# Patient Record
(demographics unavailable — no encounter records)

---

## 2025-06-03 NOTE — END OF VISIT
[FreeTextEntry3] : I, Jerardo Cosme MD, I personally performed the services described in the documentation, reviewed the documentation recorded by the scribe in my presence and it accurately and completely records my words and actions

## 2025-06-03 NOTE — REASON FOR VISIT
[Consultation] : a consultation visit [Family Member] : family member [Parents] : parents [Other: _____] : [unfilled] [FreeTextEntry1] : Hip evaluation

## 2025-06-03 NOTE — PHYSICAL EXAM
[FreeTextEntry1] : General: Patient is awake and alert and in no acute distress, oriented to person. Well developed, well nourished, cooperative.   Skin: The skin is intact, warm, pink, and dry over the area examined.    Eyes: normal conjunctiva, normal eyelids and pupils were equal and round.   ENT: normal ears, normal nose and normal lips.  Cardiovascular: There is brisk capillary refill in the digits of the affected extremity. They are symmetric pulses in the bilateral upper and lower extremities, positive peripheral pulses, brisk capillary refill, but no peripheral edema.  Respiratory: The patient is in no apparent respiratory distress. They're taking full deep breaths without use of accessory muscles or evidence of audible wheezes or stridor without the use of a stethoscope, normal respiratory effort.   Neurological: 5/5 motor strength in the main muscle groups of bilateral lower extremities, sensory intact in bilateral lower extremities.   Musculoskeletal: Examination of bilateral lower extremities reveals wide symmetric abduction of bilateral hips to greater than 60. Supple internal and external rotation of bilateral hips.  Bilateral knees with full range of motion. Both knees are clinically stable. Right leg appears slightly shorter than left.  Bilateral ankle dorsiflexion to +20.Left fourth curly toe with no callus  Child is ambulating independently.  No falls observed.  Spine appears grossly midline without midline spine defects. No robin of hair. No dimple, no sinus.

## 2025-06-03 NOTE — DEVELOPMENTAL MILESTONES
[Normal] : Developmental history within normal limits [Roll Over: ___ Months] : Roll Over: [unfilled] months [Sit Up: ___ Months] : Sit Up: [unfilled] months [Pull Self to Stand ___ Months] : Pull self to stand: [unfilled] months [Walk ___ Months] : Walk: [unfilled] months [Not Yet Determined] : not yet determined

## 2025-06-03 NOTE — ASSESSMENT
[FreeTextEntry1] : 18-month-old female with mild limb length discrepancy with left leg longer than right and left fourth curly toe  Today's assessment was performed with the assistance of the patient's parent as an independent historian to corroborate the patients history. Clinical exam and imaging reviewed with patient and family at length.  No evidence of hip dysplasia on radiographs obtained today.  She does have mild limiting discrepancy with left leg slightly longer than right and left fourth curly toe for which I recommended observation only.Activities as tolerated.  She will return for follow-up on an annual basis for repeat clinical evaluation.  All questions answered, understanding verbalized. Parent and patient in agreement with plan of care.  I, Amber Jones, have acted as a scribe and documented the above information for Dr. Cosme  The above documentation completed by the scribe is an accurate record of both my words and actions.  This note was generated using Dragon medical dictation software. A reasonable effort has been made for proofreading its contents, but typos may still remain. If there are any questions or points of clarification needed please do not hesitate to contact my office.

## 2025-06-03 NOTE — BIRTH HISTORY
[Duration: ___ wks] : duration: [unfilled] weeks [] :  [___ lbs.] : [unfilled] lbs [___ oz.] : [unfilled] oz. [Was child in NICU?] : Child was in NICU [FreeTextEntry6] : Placenta previa, placental rupture [FreeTextEntry7] : 4 days

## 2025-06-03 NOTE — DATA REVIEWED
[de-identified] : 6/3/2025: AP and frog pelvis x-rays ordered, obtained and independently reviewed today.  Both hips appear well located.  S[place]ton's arc is intact.  Acetabular index is appropriate for age

## 2025-06-03 NOTE — HISTORY OF PRESENT ILLNESS
[0] : currently ~his/her~ pain is 0 out of 10 [FreeTextEntry1] : 18-month-old female, otherwise healthy presents today with parents and grandmother for evaluation of hips.  Mother reports child's younger cousin was recently seen in this office to be evaluated for hip dysplasia and is scheduled to undergo ultrasound.  Results pending.  Mother has noticed leg length discrepancy in this child with left leg slightly longer than right, noted a few weeks ago.  She has also noticed left fourth curly toe.  Birth history reported as 37 weeks gestation with  delivery for history of placental previa and ruptured placenta.  No history of breech presentation.  She is the first born child for these parents.  No known family history of early hip replacements or hip dysplasia.  Child began walking at 14 months of age.  She is now running and playing and keeping up with other children her age.  Parents deny difficulty with shoe wear or toe calluses secondary to curly toe.  She presents today for further evaluation and management regarding the same.